# Patient Record
Sex: MALE | Race: WHITE | URBAN - METROPOLITAN AREA
[De-identification: names, ages, dates, MRNs, and addresses within clinical notes are randomized per-mention and may not be internally consistent; named-entity substitution may affect disease eponyms.]

---

## 2018-02-15 ENCOUNTER — APPOINTMENT (OUTPATIENT)
Dept: PHYSICAL THERAPY | Age: 58
End: 2018-02-15

## 2018-02-21 ENCOUNTER — HOSPITAL ENCOUNTER (OUTPATIENT)
Dept: PHYSICAL THERAPY | Age: 58
Discharge: HOME | End: 2018-02-21
Payer: COMMERCIAL

## 2018-02-21 PROCEDURE — 97110 THERAPEUTIC EXERCISES: CPT | Performed by: PHYSICAL THERAPIST

## 2018-02-21 PROCEDURE — 97116 GAIT TRAINING THERAPY: CPT | Performed by: PHYSICAL THERAPIST

## 2018-02-21 PROCEDURE — 97162 PT EVAL MOD COMPLEX 30 MIN: CPT | Performed by: PHYSICAL THERAPIST

## 2018-02-21 NOTE — PROGRESS NOTES
Brenna Patton 32  [facility] AT [facility] [address], [address], [address]  Phone: [phone]  Fax: [phone] / [address] [address] PHYSICAL THERAPY SERVICES  Patient Name: Delbert Menjivar : 1960   Medical   Diagnosis: Left leg pain [M79.605] Treatment Diagnosis: L Knee Pain   Onset Date: 2017     Referral Source: Camila Pelaez MD [facility]): 2018   Prior Hospitalization: See medical history Provider #: [id number]   Prior Level of Function: Pt was walking without AD prior to fall/left knee surgery   Comorbidities: HTN, h/o R DAVID, L knee scope in 2017   Medications: Verified on Patient Summary List   The Plan of Care and following information is based on the information from the initial evaluation.   ===========================================================================================  Assessment / key information:  66 y/o male presents to PT with one year h/o of left knee pain. He had arthroscopic menisectomy in 2017, and has been using a SPC since that time. Pt walks with significant lean to the right in standing, and lacks left knee stability when standing/walking. He notes anterior knee pain, which is worse during transfers. Examination reveals significant atrophy of left quad. He is unable to perform a strong quad set, and SLR has a painful lag. Pt is unable to take steps with SPC, due pain and feelings of left knee instability. Pt has focal swelling at peripatellar area.   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  ===========================================================================================  Eval Complexity: History MEDIUM  Complexity : 1-2 comorbidities / personal factors will impact the outcome/ POC ;  Examination  MEDIUM Complexity : 3 Standardized tests and measures addressing body structure, function, activity limitation and / or participation in recreation ; Presentation MEDIUM Complexity : Evolving with changing characteristics ; Decision Making MEDIUM Complexity : FOTO score of 26-74; Overall Complexity MEDIUM  Problem List: pain affecting function, decrease ROM, decrease strength, edema affecting function, impaired gait/ balance, decrease ADL/ functional abilitiies, decrease activity tolerance, decrease flexibility/ joint mobility and decrease transfer abilities   Treatment Plan may include any combination of the following: Therapeutic exercise, Therapeutic activities, Neuromuscular re-education, Physical agent/modality, Gait/balance training, Manual therapy, Dry Needling, Aquatic therapy, Patient education, Self Care training, Functional mobility training, Home safety training and Stair training  Patient / Family readiness to learn indicated by: asking questions, trying to perform skills and interest  Persons(s) to be included in education: patient (P)  Barriers to Learning/Limitations: None  Measures taken:    Patient Goal (s): \"get me ready for partial knee replacement ASAP\"   Patient self reported health status: good  Rehabilitation Potential: good   Short Term Goals: To be accomplished in  2  weeks:  1. Pt independent with use of cold packs and HEP for therex with minimal PF forces. 2. Pt able to minimize pain to </= 5/10 with HEP and activity modification.  Long Term Goals: To be accomplished in  6  weeks:  1. Pt to increase FOTO score from 30 to >/=53.  2. Pt independent with high level HEP for left knee flexibility, strengthening/endurance, and proprioception. 3. Pt able to walk with equal gait components bilaterally with LRAD. Frequency / Duration:   Patient to be seen  2-3  times per week for 6  weeks:  Patient / Caregiver education and instruction: self care, activity modification and exercises  G-Codes (GP): mame  Therapist Signature:  Lisa Aguilar PT Date: 0/57/2316   Certification Period: na Time: 10:55 AM   ===========================================================================================  I certify that the above Physical Therapy Services are being furnished while the patient is under my care. I agree with the treatment plan and certify that this therapy is necessary. Physician Signature:        Date:       Time:     Please sign and return to [facility] or you may fax the signed copy to [phone]. Thank you.

## 2018-02-21 NOTE — PROGRESS NOTES
PHYSICAL THERAPY - DAILY TREATMENT NOTE    Patient Name: Claire Price        Date: 2018  : 1960   YES Patient  Verified  Visit #:     Insurance: Payor: Estefania Elias / Plan:  [address] [facility] / Product Type: Commerical /      In time: 3:00 Out time: 4:00   Total Treatment Time: 60     Medicare Time Tracking (below)   Total Timed Codes (min):  na 1:1 Treatment Time:  na     TREATMENT AREA = Left leg pain [M79.605]    SUBJECTIVE  Pain Level (on 0 to 10 scale):  0-8  / 10   Medication Changes/New allergies or changes in medical history, any new surgeries or procedures?     NO    If yes, update Summary List   Subjective Functional Status/Changes:  []  No changes reported     See eval/POC          OBJECTIVE  Modalities Rationale:     decrease edema, decrease inflammation and decrease pain to improve patient's ability to walk without pain   min [] Estim, type/location:                                      []  att     []  unatt     []  w/US     []  w/ice    []  w/heat    min []  Mechanical Traction: type/lbs                   []  pro   []  sup   []  int   []  cont    []  before manual    []  after manual    min []  Ultrasound, settings/location:      min []  Iontophoresis w/ dexamethasone, location:                                               []  take home patch       []  in clinic   10 min [x]  Ice     []  Heat    location/position: L knee - supine after treatment    min []  Vasopneumatic Device, press/temp:     min []  Other:    [] Skin assessment post-treatment (if applicable):    []  intact    []  redness- no adverse reaction     []redness  adverse reaction:        15 min Therapeutic Exercise:  [x]  See flow sheet   Rationale:      increase ROM, increase strength and improve coordination to improve the patients ability to regain normal walking tolerance        min Therapeutic Activity:         min Neuromuscular Re-ed:        10 min Gait Training: Emphasis on L TKE during single limb support min Patient Education:  YES  Reviewed HEP   []  Progressed/Changed HEP based on: Other Objective/Functional Measures:    FOTO - 30     Post Treatment Pain Level (on 0 to 10) scale:   0  / 10     ASSESSMENT  Assessment/Changes in Function:     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    Progress toward goals / Updated goals:    Goals established today     PLAN  [x]  Upgrade activities as tolerated YES Continue plan of care   []  Discharge due to :    []  Other:      Therapist: Rodney Resendiz PT    Date: 2/21/2018 Time: 10:54 AM     Future Appointments  Date Time Provider [place]   2/21/2018 3:00 PM Rodney Resendiz PT [facility]

## 2018-02-23 ENCOUNTER — HOSPITAL ENCOUNTER (OUTPATIENT)
Dept: PHYSICAL THERAPY | Age: 58
Discharge: HOME | End: 2018-02-23
Payer: COMMERCIAL

## 2018-02-23 PROCEDURE — 97110 THERAPEUTIC EXERCISES: CPT

## 2018-02-23 NOTE — PROGRESS NOTES
PHYSICAL THERAPY - DAILY TREATMENT NOTE    Patient Name: Baudilio Dobbs        Date: 2018  : 1960   YES Patient  Verified  Visit #:     Insurance: Payor: Ted Norwood / Plan:  [address] [facility] / Product Type: Commerical /      In time: 3:35pm Out time: 4:30pm   Total Treatment Time: 55     Medicare Time Tracking (below)   Total Timed Codes (min):  na 1:1 Treatment Time:  na     TREATMENT AREA =  Left leg pain [M79.605]  SUBJECTIVE  Pain Level (on 0 to 10 scale):  0-8  / 10   Medication Changes/New allergies or changes in medical history, any new surgeries or procedures? NO    If yes, update Summary List   Subjective Functional Status/Changes:  []  No changes reported     \" I still have difficulty walking but I've been using my SPC. \"      OBJECTIVE  Modalities Rationale:     decrease inflammation, decrease pain and increase tissue extensibility to improve patient's ability to perform functional ADLs   min [] Estim, type/location:                                      []  att     []  unatt     []  w/US     []  w/ice    []  w/heat    min []  Mechanical Traction: type/lbs                   []  pro   []  sup   []  int   []  cont    []  before manual    []  after manual    min []  Ultrasound, settings/location:      min []  Iontophoresis w/ dexamethasone, location:                                               []  take home patch       []  in clinic   10 min [x]  Ice     []  Heat    location/position: Supine to L knee post treatment.     min []  Vasopneumatic Device, press/temp:     min []  Other:    [] Skin assessment post-treatment (if applicable):    []  intact    []  redness- no adverse reaction     []redness  adverse reaction:        45 min Therapeutic Exercise:  [x]  See flow sheet   Rationale:      increase ROM and increase strength to improve the patients ability to regain functional mobility of L knee for amb.     min Manual Therapy:    Rationale:      decrease pain, increase ROM, increase tissue extensibility and decrease trigger points to improve the patient's ability to regain full functional mobility     min Therapeutic Activity:    Rationale:    increase strength, improve coordination and increase proprioception to improve the patients ability to      min Neuromuscular Re-ed:    Rationale:    improve coordination, improve balance and increase proprioception to improve the patients ability to      min Gait Training:    Rationale:       min Patient Education:  YES  Reviewed HEP   []  Progressed/Changed HEP based on: Other Objective/Functional Measures:    Initiated therex per FS with focus on L LE strengthening for improved gait mechanics. Post Treatment Pain Level (on 0 to 10) scale:   0  / 10     ASSESSMENT  Assessment/Changes in Function:   VC's on TKE upon heel strike and WS. Amb with FWW to practice proper gait mechanics. Noted overall hip and knee weakness in L LE.   []  See Progress Note/Recertification   Patient will continue to benefit from skilled PT services to modify and progress therapeutic interventions, address functional mobility deficits, address ROM deficits, address strength deficits, analyze and address soft tissue restrictions, analyze and cue movement patterns, analyze and modify body mechanics/ergonomics and assess and modify postural abnormalities to attain remaining goals. Progress toward goals / Updated goals:    Progressing towards newly established goals.       PLAN  [x]  Upgrade activities as tolerated YES Continue plan of care   []  Discharge due to :    []  Other:      Therapist: SPRING Guidry    Date: 2/23/2018 Time: 4:59 PM     Future Appointments  Date Time Provider [place]   2/26/2018 3:30 PM Lee Pipe, PT [facility]   2/28/2018 4:30 PM Lee Pipe, PT [facility]   3/5/2018 3:30 PM Lee Pipe, PT [facility]   3/7/2018 4:30 PM Lee Pipe, PT [facility]   3/12/2018 4:00 PM Lee Pipe, PT [facility]   3/14/2018 5:00 PM Nilsa Hameed PT [facility]   3/19/2018 4:00 PM Nilsa Hameed PT [facility]   3/21/2018 5:00 PM Nilsa Hameed PT [facility]   3/26/2018 4:00 PM Nilsa Hameed PT [facility]   3/28/2018 5:00 PM Nilsa Hameed PT [facility]

## 2018-02-26 ENCOUNTER — HOSPITAL ENCOUNTER (OUTPATIENT)
Dept: PHYSICAL THERAPY | Age: 58
Discharge: HOME | End: 2018-02-26
Payer: COMMERCIAL

## 2018-02-26 PROCEDURE — 97116 GAIT TRAINING THERAPY: CPT | Performed by: PHYSICAL THERAPIST

## 2018-02-26 PROCEDURE — 97110 THERAPEUTIC EXERCISES: CPT | Performed by: PHYSICAL THERAPIST

## 2018-02-26 NOTE — PROGRESS NOTES
PHYSICAL THERAPY - DAILY TREATMENT NOTE    Patient Name: Katia Lopez        Date: 2018  : 1960   YES Patient  Verified  Visit #:   3   of   12  Insurance: Payor: Tasha Ours / Plan:  [address] [facility] / Product Type: Commerical /      In time: 3:40 Out time: 4:30   Total Treatment Time: 50     Medicare Time Tracking (below)   Total Timed Codes (min):  na 1:1 Treatment Time:  na     TREATMENT AREA = Left leg pain [M79.605]    SUBJECTIVE  Pain Level (on 0 to 10 scale):  4  / 10   Medication Changes/New allergies or changes in medical history, any new surgeries or procedures? NO    If yes, update Summary List   Subjective Functional Status/Changes:  []  No changes reported     Pt reports knee is getting stronger, slowly.           OBJECTIVE  Modalities Rationale:     decrease inflammation, decrease pain and increase tissue extensibility to improve patient's ability to prevent soreness   min [] Estim, type/location:                                      []  att     []  unatt     []  w/US     []  w/ice    []  w/heat    min []  Mechanical Traction: type/lbs                   []  pro   []  sup   []  int   []  cont    []  before manual    []  after manual    min []  Ultrasound, settings/location:      min []  Iontophoresis w/ dexamethasone, location:                                               []  take home patch       []  in clinic   10 min [x]  Ice     []  Heat    location/position: L knee - supine after treatment    min []  Vasopneumatic Device, press/temp:     min []  Other:    [] Skin assessment post-treatment (if applicable):    []  intact    []  redness- no adverse reaction     []redness  adverse reaction:        30 min Therapeutic Exercise:  [x]  See flow sheet   Rationale:      increase ROM, increase strength and improve coordination to improve the patients ability to walk normally        min Therapeutic Activity:         min Neuromuscular Re-ed:        10 min Gait Training: WS -> gait training with emphasis on weight shifting left during L mid stance, and maintaining control of TKE during stance phase      min Patient Education:  YES  Reviewed HEP   []  Progressed/Changed HEP based on: Other Objective/Functional Measures:    Pt was randlel to walk without symptoms when he concentrated on weight shifting left during L stance phase and maintained TKE. He had some symptoms when her lost TIKE control. Pt had difficulty with getting full AROM during sidelying clam, but did well with hooklying with theraband     Post Treatment Pain Level (on 0 to 10) scale:   3  / 10     ASSESSMENT  Assessment/Changes in Function:     Pt showing signs of improved gait once he gets loosened up. Encouraged increase use of CP at home to help manage swelling/pain. []  See Progress Note/Recertification   Patient will continue to benefit from skilled PT services to modify and progress therapeutic interventions, address functional mobility deficits, address ROM deficits, address strength deficits, analyze and address soft tissue restrictions, analyze and cue movement patterns, analyze and modify body mechanics/ergonomics and assess and modify postural abnormalities to attain remaining goals. Progress toward goals / Updated goals: Will continue to progress quad strength and standing knee stability as tolerated.      PLAN  [x]  Upgrade activities as tolerated YES Continue plan of care   []  Discharge due to :    []  Other:      Therapist: Howard Vegas PT    Date: 2/26/2018 Time: 11:37 AM     Future Appointments  Date Time Provider [place]   2/26/2018 3:30 PM Howard Vegas, PT [facility]   2/28/2018 4:30 PM Howard Vegas, PT [facility]   3/5/2018 3:30 PM Howard Vegas, PT [facility]   3/7/2018 4:30 PM Howard Vegas, PT [facility]   3/12/2018 4:00 PM Howard Vegas, PT [facility]   3/14/2018 5:00 PM Howard Vegas, PT [facility]   3/19/2018 4:00 PM Howard Vegas, PT [facility]   3/21/2018 5:00 PM Donna Quintanaigham, [facility]   3/26/2018 4:00 PM Donna Quintanaigham, [facility]   3/28/2018 5:00 PM [facility], [facility]

## 2018-02-28 ENCOUNTER — APPOINTMENT (OUTPATIENT)
Dept: PHYSICAL THERAPY | Age: 58
End: 2018-02-28
Payer: COMMERCIAL

## 2018-03-05 ENCOUNTER — HOSPITAL ENCOUNTER (OUTPATIENT)
Dept: PHYSICAL THERAPY | Age: 58
Discharge: HOME | End: 2018-03-05
Payer: COMMERCIAL

## 2018-03-05 PROCEDURE — 97110 THERAPEUTIC EXERCISES: CPT | Performed by: PHYSICAL THERAPIST

## 2018-03-05 PROCEDURE — 97116 GAIT TRAINING THERAPY: CPT | Performed by: PHYSICAL THERAPIST

## 2018-03-05 NOTE — PROGRESS NOTES
PHYSICAL THERAPY - DAILY TREATMENT NOTE    Patient Name: Donna Osuna        Date: 3/5/2018  : 1960   YES Patient  Verified  Visit #:     Insurance: Payor: Jenny Skinner / Plan:  [address] [facility] / Product Type: Commerical /      In time: 3:30 Out time: 4:20   Total Treatment Time: 50     Medicare Time Tracking (below)   Total Timed Codes (min):  na 1:1 Treatment Time:  na     TREATMENT AREA = Left leg pain [M79.605]    SUBJECTIVE  Pain Level (on 0 to 10 scale):  3  / 10   Medication Changes/New allergies or changes in medical history, any new surgeries or procedures? NO    If yes, update Summary List   Subjective Functional Status/Changes:  []  No changes reported     Pt reports able to fire quad more easily, but having difficulty with carryover to normal walking.          OBJECTIVE  Modalities Rationale:     decrease inflammation, decrease pain and increase tissue extensibility to improve patient's ability to prevent soreness   min [] Estim, type/location:                                      []  att     []  unatt     []  w/US     []  w/ice    []  w/heat    min []  Mechanical Traction: type/lbs                   []  pro   []  sup   []  int   []  cont    []  before manual    []  after manual    min []  Ultrasound, settings/location:      min []  Iontophoresis w/ dexamethasone, location:                                               []  take home patch       []  in clinic   10 min [x]  Ice     []  Heat    location/position: L knee - supine after treatment    min []  Vasopneumatic Device, press/temp:     min []  Other:    [] Skin assessment post-treatment (if applicable):    []  intact    []  redness- no adverse reaction     []redness  adverse reaction:        30 min Therapeutic Exercise:  [x]  See flow sheet   Rationale:      increase ROM, increase strength and improve coordination to improve the patients ability to improve gait mechanics/endurance        min Therapeutic Activity: min Neuromuscular Re-ed:        10 min Gait Training: Practiced with SPC and emphasizing weight shift L during L single limb support      min Patient Education:  YES  Reviewed HEP   []  Progressed/Changed HEP based on: Other Objective/Functional Measures: Added SLS today in // bars     Post Treatment Pain Level (on 0 to 10) scale:   0  / 10     ASSESSMENT  Assessment/Changes in Function:     Pt is progressing well with strengthening and can walk with normal mechanics using SPC as tolerated. []  See Progress Note/Recertification   Patient will continue to benefit from skilled PT services to modify and progress therapeutic interventions, address functional mobility deficits, address ROM deficits, address strength deficits, analyze and address soft tissue restrictions, analyze and cue movement patterns, analyze and modify body mechanics/ergonomics and assess and modify postural abnormalities to attain remaining goals. Progress toward goals / Updated goals:    Pt is not consistent, but can demonstrate normal gait mechanics without practice.      PLAN  [x]  Upgrade activities as tolerated YES Continue plan of care   []  Discharge due to :    []  Other:      Therapist: Carmela Hong PT    Date: 3/5/2018 Time: 1:12 PM     Future Appointments  Date Time Provider Long [place]   3/5/2018 3:30 PM Carmela Rounds, PT [facility]   3/7/2018 4:30 PM Carmela Rounds, PT [facility]   3/12/2018 4:00 PM Carmela Rounds, PT [facility]   3/14/2018 5:00 PM Carmela Rounds, PT [facility]   3/19/2018 4:00 PM Carmela Rounds, PT [facility]   3/21/2018 5:00 PM Carmela Rounds, PT [facility]   3/26/2018 4:00 PM Carmela Rounds, PT [facility]   3/28/2018 5:00 PM Carmela Rounds, PT [facility]

## 2018-03-07 ENCOUNTER — HOSPITAL ENCOUNTER (OUTPATIENT)
Dept: PHYSICAL THERAPY | Age: 58
Discharge: HOME | End: 2018-03-07
Payer: COMMERCIAL

## 2018-03-07 PROCEDURE — 97110 THERAPEUTIC EXERCISES: CPT | Performed by: PHYSICAL THERAPIST

## 2018-03-07 PROCEDURE — 97116 GAIT TRAINING THERAPY: CPT | Performed by: PHYSICAL THERAPIST

## 2018-03-07 NOTE — PROGRESS NOTES
PHYSICAL THERAPY - DAILY TREATMENT NOTE    Patient Name: Juan Flores        Date: 3/7/2018  : 1960   YES Patient  Verified  Visit #:     Insurance: Payor: Sierra Morris / Plan:  [address] [facility] / Product Type: Commerical /      In time: 4:30 Out time: 5:40   Total Treatment Time: 70     Medicare Time Tracking (below)   Total Timed Codes (min):  na 1:1 Treatment Time:  na     TREATMENT AREA = Left leg pain [M79.605]    SUBJECTIVE  Pain Level (on 0 to 10 scale):  3  / 10   Medication Changes/New allergies or changes in medical history, any new surgeries or procedures? NO    If yes, update Summary List   Subjective Functional Status/Changes:  []  No changes reported     Pt notes having more control over his pain by achieving full knee extension when walking.           OBJECTIVE  Modalities Rationale:     decrease edema, decrease inflammation and decrease pain to improve patient's ability to improve walking tolerance   min [] Estim, type/location:                                      []  att     []  unatt     []  w/US     []  w/ice    []  w/heat    min []  Mechanical Traction: type/lbs                   []  pro   []  sup   []  int   []  cont    []  before manual    []  after manual    min []  Ultrasound, settings/location:      min []  Iontophoresis w/ dexamethasone, location:                                               []  take home patch       []  in clinic   10 min [x]  Ice     []  Heat    location/position: L knee - supine after treatment    min []  Vasopneumatic Device, press/temp:     min []  Other:    [] Skin assessment post-treatment (if applicable):    []  intact    []  redness- no adverse reaction     []redness  adverse reaction:        45 min Therapeutic Exercise:  [x]  See flow sheet   Rationale:      increase ROM, increase strength and improve coordination to improve the patients ability to increase standing/walking tolerance        min Therapeutic Activity:         min Neuromuscular Re-ed:        15 min Gait Training: Weight shifting, SLS, and walking with emphasis on weight shifting to the left to stand more erect during gait with SPC - using mirror for feedback      min Patient Education:  YES  Reviewed HEP   []  Progressed/Changed HEP based on: Other Objective/Functional Measures: Added mini squats to using mirror for feedback on symmetry     Post Treatment Pain Level (on 0 to 10) scale:   0  / 10     ASSESSMENT  Assessment/Changes in Function:     Pt did not have much knee symptoms during mini squats and did increase depth on second set. []  See Progress Note/Recertification   Patient will continue to benefit from skilled PT services to modify and progress therapeutic interventions, address functional mobility deficits, address ROM deficits, address strength deficits, analyze and address soft tissue restrictions, analyze and cue movement patterns, analyze and modify body mechanics/ergonomics and assess and modify postural abnormalities to attain remaining goals. Progress toward goals / Updated goals: Will progress quad strengthening as tolerated.      PLAN  [x]  Upgrade activities as tolerated YES Continue plan of care   []  Discharge due to :    []  Other:      Therapist: Álvaro Stephens PT    Date: 3/7/2018 Time: 3:36 PM     Future Appointments  Date Time Provider [place]   3/7/2018 4:30 PM Álvaro Stephens PT [facility]   3/12/2018 4:00 PM Álvaro Stephens, PT [facility]   3/14/2018 5:00 PM Álvaro Stephens, PT [facility]   3/19/2018 4:00 PM Álvaro Stephens PT [facility]   3/21/2018 5:00 PM Álvaro Stephens PT [facility]   3/26/2018 4:00 PM Álvaro Stephens PT [facility]   3/28/2018 5:00 PM Álvaro Stephens, PT [facility]

## 2018-03-12 ENCOUNTER — HOSPITAL ENCOUNTER (OUTPATIENT)
Dept: PHYSICAL THERAPY | Age: 58
End: 2018-03-12
Payer: COMMERCIAL

## 2018-03-14 ENCOUNTER — APPOINTMENT (OUTPATIENT)
Dept: PHYSICAL THERAPY | Age: 58
End: 2018-03-14
Payer: COMMERCIAL

## 2018-03-16 ENCOUNTER — HOSPITAL ENCOUNTER (OUTPATIENT)
Dept: PHYSICAL THERAPY | Age: 58
Discharge: HOME | End: 2018-03-16
Payer: COMMERCIAL

## 2018-03-16 PROCEDURE — 97110 THERAPEUTIC EXERCISES: CPT

## 2018-03-16 NOTE — PROGRESS NOTES
PHYSICAL THERAPY - DAILY TREATMENT NOTE    Patient Name: Teresa Day        Date: 3/16/2018  : 1960   YES Patient  Verified  Visit #:     Insurance: Payor: Berlin Foy / Plan: [address] / Product Type: Commerical /      In time: 3:40pm Out time: 4:45pm   Total Treatment Time: 65/60     Medicare Time Tracking (below)   Total Timed Codes (min):  na 1:1 Treatment Time:  na     TREATMENT AREA =  Left leg pain [M79.605]  SUBJECTIVE  Pain Level (on 0 to 10 scale):  2  / 10   Medication Changes/New allergies or changes in medical history, any new surgeries or procedures? NO    If yes, update Summary List   Subjective Functional Status/Changes:  []  No changes reported     \"[place] and feel a little sore in my L thigh. \"      OBJECTIVE  Modalities Rationale:     decrease inflammation, decrease pain and increase tissue extensibility to improve patient's ability to perform functional ADLs   min [] Estim, type/location:                                      []  att     []  unatt     []  w/US     []  w/ice    []  w/heat    min []  Mechanical Traction: type/lbs                   []  pro   []  sup   []  int   []  cont    []  before manual    []  after manual    min []  Ultrasound, settings/location:      min []  Iontophoresis w/ dexamethasone, location:                                               []  take home patch       []  in clinic   10 min [x]  Ice     []  Heat    location/position: Supine to L knee with wedge.     min []  Vasopneumatic Device, press/temp:     min []  Other:    [] Skin assessment post-treatment (if applicable):    []  intact    []  redness- no adverse reaction     []redness  adverse reaction:        50 min Therapeutic Exercise:  [x]  See flow sheet   Rationale:      increase ROM and increase strength to improve the patients ability to regain functional mobility of L knee for normalized gait mechanics without AD.     min Manual Therapy:    Rationale:      decrease pain, increase ROM, increase tissue extensibility and decrease trigger points to improve the patient's ability to regain full functional mobility     min Therapeutic Activity:    Rationale:    increase strength, improve coordination and increase proprioception to improve the patients ability to      min Neuromuscular Re-ed:    Rationale:    improve coordination, improve balance and increase proprioception to improve the patients ability to      min Gait Training:    Rationale:       min Patient Education:  YES  Reviewed HEP   []  Progressed/Changed HEP based on: Other Objective/Functional Measures: Added HR in parallel bars to improve ankle strategy during gait. Added 1lb for S/L ABD. Held on gait training today due to dec instability [place]. Post Treatment Pain Level (on 0 to 10) scale:   0  / 10     ASSESSMENT  Assessment/Changes in Function:   Pt demo improved TKE during SLS without UE support today. []  See Progress Note/Recertification   Patient will continue to benefit from skilled PT services to modify and progress therapeutic interventions, address functional mobility deficits, address ROM deficits, address strength deficits, analyze and address soft tissue restrictions, analyze and cue movement patterns, analyze and modify body mechanics/ergonomics and assess and modify postural abnormalities to attain remaining goals. Progress toward goals / Updated goals:    Progressing towards LTG 3.      PLAN  [x]  Upgrade activities as tolerated YES Continue plan of care   []  Discharge due to :    []  Other:      Therapist: SPRING Daugherty    Date: 3/16/2018 Time: 4:58 PM     Future Appointments  Date Time Provider L[place]   3/19/2018 4:00 PM Manpreet Henriquez PT [facility] [place]   3/21/2018 5:00 PM Manpreet Henriquez PT [facility] [place]   3/26/2018 4:00 PM Manpreet Henriquez PT [facility] [place]   3/28/2018 5:00 PM [place] Desean Khan, PT [facility] [place]

## 2018-03-19 ENCOUNTER — HOSPITAL ENCOUNTER (OUTPATIENT)
Dept: PHYSICAL THERAPY | Age: 58
Discharge: HOME | End: 2018-03-19
Payer: COMMERCIAL

## 2018-03-19 PROCEDURE — 97110 THERAPEUTIC EXERCISES: CPT | Performed by: PHYSICAL THERAPIST

## 2018-03-19 NOTE — PROGRESS NOTES
PHYSICAL THERAPY - DAILY TREATMENT NOTE    Patient Name: Juan Flores        Date: 3/19/2018  : 1960   YES Patient  Verified  Visit #:     Insurance: Payor: Sierra Morris / Plan:  [address] / Product Type: Commerical /      In time: 4:05 Out time: 5:00   Total Treatment Time: 55     Medicare Time Tracking (below)   Total Timed Codes (min):  na 1:1 Treatment Time:  na     TREATMENT AREA = Left leg pain [M79.605]    SUBJECTIVE  Pain Level (on 0 to 10 scale):  2  / 10   Medication Changes/New allergies or changes in medical history, any new surgeries or procedures? NO    If yes, update Summary List   Subjective Functional Status/Changes:  []  No changes reported     Pt reports feeling better, has less frequent/intense left knee pain.           OBJECTIVE  Modalities Rationale:     decrease edema, decrease inflammation and decrease pain to improve patient's ability to prevent soreness   min [] Estim, type/location:                                      []  att     []  unatt     []  w/US     []  w/ice    []  w/heat    min []  Mechanical Traction: type/lbs                   []  pro   []  sup   []  int   []  cont    []  before manual    []  after manual    min []  Ultrasound, settings/location:      min []  Iontophoresis w/ dexamethasone, location:                                               []  take home patch       []  in clinic   10 min [x]  Ice     []  Heat    location/position: L knee - supine after treatment    min []  Vasopneumatic Device, press/temp:     min []  Other:    [] Skin assessment post-treatment (if applicable):    []  intact    []  redness- no adverse reaction     []redness  adverse reaction:        45 min Therapeutic Exercise:  [x]  See flow sheet   Rationale:      increase ROM, increase strength, improve coordination and increase proprioception to improve the patients ability to walk with normal mechanics        min Therapeutic Activity:         min Neuromuscular Re-ed: min Gait Training:       min Patient Education:  YES  Reviewed HEP   []  Progressed/Changed HEP based on: Other Objective/Functional Measures: Added L hip flexor stretch in standing to allow hip extension during terminal stance on left during walking     Post Treatment Pain Level (on 0 to 10) scale:   0  / 10     ASSESSMENT  Assessment/Changes in Function:     Adjusted SPC up one more notch today, and pt was randell to walk with near normal weight shift and erect posture when walking (using a mirror). []  See Progress Note/Recertification   Patient will continue to benefit from skilled PT services to modify and progress therapeutic interventions, address functional mobility deficits, address ROM deficits, address strength deficits, analyze and address soft tissue restrictions, analyze and cue movement patterns, analyze and modify body mechanics/ergonomics and assess and modify postural abnormalities to attain remaining goals. Progress toward goals / Updated goals:     Will continue with strength progression     PLAN  [x]  Upgrade activities as tolerated YES Continue plan of care   []  Discharge due to :    []  Other:      Therapist: Donn Tolbert PT    Date: 3/19/2018 Time: 10:33 AM     Future Appointments  Date Time Provider [place]   3/19/2018 4:00 PM Donn Tolbert PT [facility] [place]   3/21/2018 5:00 PM Donn Tolbert PT [facility] [place]   3/26/2018 4:00 PM Donn Tolbert PT [facility] [place]   3/28/2018 5:00 PM Donn Tolbert PT [facility]

## 2018-03-21 ENCOUNTER — HOSPITAL ENCOUNTER (OUTPATIENT)
Dept: PHYSICAL THERAPY | Age: 58
Discharge: HOME | End: 2018-03-21
Payer: COMMERCIAL

## 2018-03-21 PROCEDURE — 97116 GAIT TRAINING THERAPY: CPT | Performed by: PHYSICAL THERAPIST

## 2018-03-21 PROCEDURE — 97110 THERAPEUTIC EXERCISES: CPT | Performed by: PHYSICAL THERAPIST

## 2018-03-21 NOTE — PROGRESS NOTES
PHYSICAL THERAPY - DAILY TREATMENT NOTE    Patient Name: Rico Calvo        Date: 3/21/2018  : 1960   YES Patient  Verified  Visit #:     Insurance: Payor: Zonia Lowry / Plan: [address] [place] [address] Rd PT / Product Type: Commerical /      In time: 5:50 Out time: 6:15   Total Treatment Time: 70     Medicare Time Tracking (below)   Total Timed Codes (min):  na 1:1 Treatment Time:  na     TREATMENT AREA = Left leg pain [M79.605]    SUBJECTIVE  Pain Level (on 0 to 10 scale):  2  / 10   Medication Changes/New allergies or changes in medical history, any new surgeries or procedures? NO    If yes, update Summary List   Subjective Functional Status/Changes:  []  No changes reported     Pt reports trying to walk better and has kept his SPC elevated form last session.           OBJECTIVE  Modalities Rationale:     decrease edema, decrease inflammation and decrease pain to improve patient's ability to prevent soreness   min [] Estim, type/location:                                      []  att     []  unatt     []  w/US     []  w/ice    []  w/heat    min []  Mechanical Traction: type/lbs                   []  pro   []  sup   []  int   []  cont    []  before manual    []  after manual    min []  Ultrasound, settings/location:      min []  Iontophoresis w/ dexamethasone, location:                                               []  take home patch       []  in clinic   10 min [x]  Ice     []  Heat    location/position: L knee - supine after treatment    min []  Vasopneumatic Device, press/temp:     min []  Other:    [] Skin assessment post-treatment (if applicable):    []  intact    []  redness- no adverse reaction     []redness  adverse reaction:        45 min Therapeutic Exercise:  [x]  See flow sheet   Rationale:      increase ROM, increase strength, improve coordination and increase proprioception to improve the patients ability to regain full functional strength        min Therapeutic Activity:         min Neuromuscular Re-ed:        15 min Gait Training: Using mirror to help walk with proper weight shift L and have equal stance time on each LE      min Patient Education:  YES  Reviewed HEP   []  Progressed/Changed HEP based on: Other Objective/Functional Measures:    Pt still has tendency to lean to the right when standing/walking. Added 4 inch step ups today     Post Treatment Pain Level (on 0 to 10) scale:   0  / 10     ASSESSMENT  Assessment/Changes in Function:     Pt able to walk with erect posture and without SPC, but has decreased stance time during left single limb support. He tolerated step ups well using // bars to allow UEs to assist.      []  See Progress Note/Recertification   Patient will continue to benefit from skilled PT services to modify and progress therapeutic interventions, address functional mobility deficits, address ROM deficits, address strength deficits, analyze and address soft tissue restrictions, analyze and cue movement patterns, analyze and modify body mechanics/ergonomics and assess and modify postural abnormalities to attain remaining goals.    Progress toward goals / Updated goals:    Pt continue to progress with strength and exercise tolerance     PLAN  [x]  Upgrade activities as tolerated YES Continue plan of care   []  Discharge due to :    []  Other:      Therapist: Nancie Chance PT    Date: 3/21/2018 Time: 10:17 AM     Future Appointments  Date Time Provider [place]   3/21/2018 5:00 PM [place]anc[place] [facility] [place]   3/26/2018 4:00 PM Nancie Chance [place] [facility] [place]   3/28/2018 5:00 PM Nancie Chance PT [facility] [place]

## 2018-03-26 ENCOUNTER — APPOINTMENT (OUTPATIENT)
Dept: PHYSICAL THERAPY | Age: 58
End: 2018-03-26
Payer: COMMERCIAL

## 2018-03-28 ENCOUNTER — HOSPITAL ENCOUNTER (OUTPATIENT)
Dept: PHYSICAL THERAPY | Age: 58
Discharge: HOME | End: 2018-03-28
Payer: COMMERCIAL

## 2018-03-28 PROCEDURE — 97110 THERAPEUTIC EXERCISES: CPT | Performed by: PHYSICAL THERAPIST

## 2018-03-28 PROCEDURE — 97116 GAIT TRAINING THERAPY: CPT | Performed by: PHYSICAL THERAPIST

## 2018-03-28 NOTE — PROGRESS NOTES
PHYSICAL THERAPY - DAILY TREATMENT NOTE    Patient Name: Huong Tillman        Date: 3/28/2018  : 1960   YES Patient  Verified  Visit #:     Insurance: Payor: Vimal Haque / Plan:  [address] / Product Type: Commerical /      In time: 5:05 Out time: 6:20   Total Treatment Time: 70     Medicare Time Tracking (below)   Total Timed Codes (min):  na 1:1 Treatment Time:  na     TREATMENT AREA = Left leg pain [M79.605]    SUBJECTIVE  Pain Level (on 0 to 10 scale):  3  / 10   Medication Changes/New allergies or changes in medical history, any new surgeries or procedures? NO    If yes, update Summary List   Subjective Functional Status/Changes:  []  No changes reported     Pt reports having increase knee pain after attending a minor league baseball game over the weekend. He had to park form the stadium and walked a long way to/from car.          OBJECTIVE  Modalities Rationale:     decrease edema, decrease inflammation and decrease pain to improve patient's ability to resume normal ambulation   min [] Estim, type/location:                                      []  att     []  unatt     []  w/US     []  w/ice    []  w/heat    min []  Mechanical Traction: type/lbs                   []  pro   []  sup   []  int   []  cont    []  before manual    []  after manual    min []  Ultrasound, settings/location:      min []  Iontophoresis w/ dexamethasone, location:                                               []  take home patch       []  in clinic   10 min [x]  Ice     []  Heat    location/position: L knee - supine after treatment    min []  Vasopneumatic Device, press/temp:     min []  Other:    [] Skin assessment post-treatment (if applicable):    []  intact    []  redness- no adverse reaction     []redness  adverse reaction:        45 min Therapeutic Exercise:  [x]  See flow sheet   Rationale:      increase ROM, increase strength and improve coordination to improve the patients ability to regain normal gait mechanics        min Therapeutic Activity:         min Neuromuscular Re-ed:        15 min Gait Training: Using mirror practice walking with weight shift L during R foot swing, used cane in each hand trying to change his amount of WB through L LE      min Patient Education:  YES  Reviewed HEP   []  Progressed/Changed HEP based on: Other Objective/Functional Measures:    Pt able to walk with near normal erect posture and weight shifting at times, after practice. Post Treatment Pain Level (on 0 to 10) scale:   0  / 10     ASSESSMENT  Assessment/Changes in Function:     Pt did not have any significant increase in soreness, despite pain when arriving for today;s session. []  See Progress Note/Recertification   Patient will continue to benefit from skilled PT services to modify and progress therapeutic interventions, address functional mobility deficits, address ROM deficits, address strength deficits, analyze and address soft tissue restrictions, analyze and cue movement patterns, analyze and modify body mechanics/ergonomics and assess and modify postural abnormalities to attain remaining goals. Progress toward goals / Updated goals: Will progress therex as tolerated with emphaiss on normalize gait mechanics and erect posture.      PLAN  [x]  Upgrade activities as tolerated YES Continue plan of care   []  Discharge due to :    []  Other:      Therapist: Sofy Walsh PT    Date: 3/28/2018 Time: 12:57 PM     Future Appointments  Date Time Provider [place]   3/28/2018 5:00 PM Sofy Walsh, [place] [facility] [place]   3/30/2018 4:00 PM [place] [facility] [place]

## 2018-03-30 ENCOUNTER — HOSPITAL ENCOUNTER (OUTPATIENT)
Dept: PHYSICAL THERAPY | Age: 58
Discharge: HOME | End: 2018-03-30
Payer: COMMERCIAL

## 2018-03-30 PROCEDURE — 97116 GAIT TRAINING THERAPY: CPT

## 2018-03-30 PROCEDURE — 97110 THERAPEUTIC EXERCISES: CPT

## 2018-03-30 NOTE — PROGRESS NOTES
[address] [place] [address] PHYSICAL THERAPY  [address] [facility] [address], [place], [address] -   Phone: [phone]  Fax: [phone]  [x]  PROGRESS NOTE  []  DISCHARGE SUMMARY  Patient Name: Dawna Sanchez : 1960   Treatment Diagnosis: Left leg pain [M79.605]     Referral Source: Anahy Perez MD     Date of Initial Visit: 3/30/2018 Attended Visits: 10 Missed Visits: 0     SUMMARY OF TREATMENT  Therapeutic exercises including ROM, strengthening and stretching; gait and balance training, postural re-education, modalities: CP, HEP instruction. CURRENT STATUS   66 y/o male presents to PT with one year h/o of left knee pain. He had arthroscopic menisectomy in 2017, and has been using a SPC since that time. Mr. Waleska Colunga is showing steady progress in therapy with c/o 4/10 pain at worst during increase activity such as prolong ambulation / stair negotiation. He is able to demonstrate a SLR without an ext lag, ascend a 6 in step  with B UE support, and stand on his L leg for 10 seconds with minimal assistance from R hand. Mr. Waleska Colunga continues to display overall weakness in L hip/knee and will benefit from continue therapy to work on proper gait mechanics for decrease fall risk in the community. See below for updated goals. Goal/Measure of Progress Goal Met? 1.   Pt independent with use of cold packs and HEP for therex with minimal PF forces. Status at last Eval: Established Current Status: I with HEP yes   2. Pt able to minimize pain to </= 5/10 with HEP and activity modification. Status at last Eval: 8/10 @ worst Current Status: 4/10 at worst  yes   3. Pt to increase FOTO score from 30 to >/=53. Status at last Eval: 30 Current Status: 46 progressing   4. Pt able to walk with equal gait components bilaterally with LRAD. Status at last Eval: Demoed inc knee instability while amb with SPC as evidence by dec stance time on L LE during gait.  Current Status: Equal weight shifting during amb with SPC yes     New Goals to be achieved in __3-4__  Weeks:  1. Improve FOTO score from 46 points to > or = 53 points indicating improved tolerance with ADLs in regards to prolong amb with LRRUCHI. Beulah Trujillo 2.  Pt will be able to demonstrate a 15 sec SLS on a solid surface without UE support in regards to improve midstance during gait without AD. 3.  Pt will demo >/= 4+//5 overall L knee strength for stair/obstacle negotiation at home and in the community. G-Codes (GP): na  RECOMMENDATIONS    Specifics: Pt to continue PT 2-3x/week for 3-4 weeks to progress towards LTGs and final HEP. If you have any questions/comments please contact us directly at [phone]. Thank you for allowing us to assist in the care of your patient. Therapist Signature: Sotero Gonzalez PTA Date: 3/30/2018   Reporting Period: 2/21/2018-3/30/2018 Time: 3:32 PM   NOTE TO PHYSICIAN:  PLEASE COMPLETE THE ORDERS BELOW AND FAX TO   [facility] [place] Physical Therapy: [phone]  If you are unable to process this request in 24 hours please contact our office: [phone]    ___ I have read the above report and request that my patient continue as recommended.   ___ I have read the above report and request that my patient continue therapy with the following changes/special instructions:_________________________________________________________   ___ I have read the above report and request that my patient be discharged from therapy.      Physician Signature:        Date:       Time:

## 2018-03-30 NOTE — PROGRESS NOTES
PHYSICAL THERAPY - DAILY TREATMENT NOTE    Patient Name: Karli Recinos        Date: 3/30/2018  : 1960   YES Patient  Verified  Visit #:   10   of   12  Insurance: Payor: Nuris NVELO / Plan: [address] [place] [address] Ramon PT / Product Type: Commerical /      In time: 4:00pm Out time: 5:00pm   Total Treatment Time: 60     Medicare Time Tracking (below)   Total Timed Codes (min):  na 1:1 Treatment Time:  na     TREATMENT AREA =  Left leg pain [M79.605]  SUBJECTIVE  Pain Level (on 0 to 10 scale):  2  / 10   Medication Changes/New allergies or changes in medical history, any new surgeries or procedures? NO    If yes, update Summary List   Subjective Functional Status/Changes:  []  No changes reported     Pt reports that he has been working on his walking with the cane on his L side for inc weight bearing through L leg. OBJECTIVE  Modalities Rationale:     decrease inflammation, decrease pain and increase tissue extensibility to improve patient's ability to perform functional ADLs   min [] Estim, type/location:                                      []  att     []  unatt     []  w/US     []  w/ice    []  w/heat    min []  Mechanical Traction: type/lbs                   []  pro   []  sup   []  int   []  cont    []  before manual    []  after manual    min []  Ultrasound, settings/location:      min []  Iontophoresis w/ dexamethasone, location:                                               []  take home patch       []  in clinic   10 min [x]  Ice     []  Heat    location/position: L knee - supine after treatment    min []  Vasopneumatic Device, press/temp:     min []  Other:    [] Skin assessment post-treatment (if applicable):    []  intact    []  redness- no adverse reaction     []redness  adverse reaction:        40 min Therapeutic Exercise:  [x]  See flow sheet   Rationale:      increase ROM and increase strength to improve the patients ability to regain functional mobility of L knee for ambulating without AD. min Manual Therapy:    Rationale:      decrease pain, increase ROM, increase tissue extensibility and decrease trigger points to improve the patient's ability to regain full functional mobility     min Therapeutic Activity:    Rationale:    increase strength, improve coordination and increase proprioception to improve the patients ability to      min Neuromuscular Re-ed:    Rationale:    improve coordination, improve balance and increase proprioception to improve the patients ability to     10 min Gait Training: Using mirror to help walk with proper weight shift L and have equal stance time on each LE   Rationale:       min Patient Education:  YES  Reviewed HEP   []  Progressed/Changed HEP based on: Other Objective/Functional Measures:    Foto: 46    Added standing hip ABD     Post Treatment Pain Level (on 0 to 10) scale:   0  / 10     ASSESSMENT  Assessment/Changes in Function:     See PN     []  See Progress Note/Recertification   Patient will continue to benefit from skilled PT services to modify and progress therapeutic interventions, address functional mobility deficits, address ROM deficits, address strength deficits, analyze and address soft tissue restrictions, analyze and cue movement patterns, analyze and modify body mechanics/ergonomics and assess and modify postural abnormalities to attain remaining goals. Progress toward goals / Updated goals: · Short Term Goals: To be accomplished in  2  weeks:  1. Pt independent with use of cold packs and HEP for therex with minimal PF forces. 2. Pt able to minimize pain to </= 5/10 with HEP and activity modification. · Long Term Goals: To be accomplished in  6  weeks:  1. Pt to increase FOTO score from 30 to >/=53.  2. Pt independent with high level HEP for left knee flexibility, strengthening/endurance, and proprioception. 3. Pt able to walk with equal gait components bilaterally with LRAD.      PLAN  [x]  Upgrade activities as tolerated YES Continue plan of care   []  Discharge due to :    []  Other:      Therapist: SPRING Johnson    Date: 3/30/2018 Time: 3:27 PM     Future Appointments  Date Time Provider [place]   3/30/2018 4:00 PM Aristeo Chirinos [facility] [place]

## 2018-04-09 ENCOUNTER — HOSPITAL ENCOUNTER (OUTPATIENT)
Dept: PHYSICAL THERAPY | Age: 58
Discharge: HOME | End: 2018-04-09
Payer: COMMERCIAL

## 2018-04-09 PROCEDURE — 97116 GAIT TRAINING THERAPY: CPT | Performed by: PHYSICAL THERAPIST

## 2018-04-09 PROCEDURE — 97110 THERAPEUTIC EXERCISES: CPT | Performed by: PHYSICAL THERAPIST

## 2018-04-09 NOTE — PROGRESS NOTES
PHYSICAL THERAPY - DAILY TREATMENT NOTE    Patient Name: Yeimy Rahman        Date: 2018  : 1960   YES Patient  Verified  Visit #:     Insurance: Payor: Kylee Bhakta / [facility]  [facility] / Product Type: Commerical /      In time: 3:40 Out time: 4:40   Total Treatment Time: 60     Medicare Time Tracking (below)   Total Timed Codes (min):  na 1:1 Treatment Time:  na     TREATMENT AREA = Left leg pain [M79.605]    SUBJECTIVE  Pain Level (on 0 to 10 scale):  3  / 10   Medication Changes/New allergies or changes in medical history, any new surgeries or procedures? NO    If yes, update Summary List   Subjective Functional Status/Changes:  []  No changes reported     Pt notes seeing MD and received a knee brace which has helped to reduce left knee symptoms in standing/walking.           OBJECTIVE  Modalities Rationale:     decrease edema, decrease inflammation and decrease pain to improve patient's ability to prevent soreness   min [] Estim, type/location:                                      []  att     []  unatt     []  w/US     []  w/ice    []  w/heat    min []  Mechanical Traction: type/lbs                   []  pro   []  sup   []  int   []  cont    []  before manual    []  after manual    min []  Ultrasound, settings/location:      min []  Iontophoresis w/ dexamethasone, location:                                               []  take home patch       []  in clinic   10 min [x]  Ice     []  Heat    location/position: L knee - supine after treatment    min []  Vasopneumatic Device, press/temp:     min []  Other:    [] Skin assessment post-treatment (if applicable):    []  intact    []  redness- no adverse reaction     []redness  adverse reaction:        40 min Therapeutic Exercise:  [x]  See flow sheet   Rationale:      increase ROM, increase strength, improve coordination and increase proprioception to improve the patients ability to regain functional standing/walkign tolerance min Therapeutic Activity:         min Neuromuscular Re-ed:        10 min Gait Training: Used mirror to help correct gait mechanics. Required VCs to weight shift pelvis left during left single limb support. min Patient Education:  YES  Reviewed HEP   []  Progressed/Changed HEP based on: Other Objective/Functional Measures:    Pt showing difficulty with stepping up on 6 inch step. Requires UEs and uses right ankle to assist with plantar flexion. Post Treatment Pain Level (on 0 to 10) scale:   0  / 10     ASSESSMENT  Assessment/Changes in Function:     Pt requires VCs to correct gait mechanics and he still requires/relies on SPC. []  See Progress Note/Recertification   Patient will continue to benefit from skilled PT services to modify and progress therapeutic interventions, address functional mobility deficits, address ROM deficits, address strength deficits, analyze and address soft tissue restrictions, analyze and cue movement patterns, analyze and modify body mechanics/ergonomics and assess and modify postural abnormalities to attain remaining goals. Progress toward goals / Updated goals: Will continue to progress left knee strength to increase functional activity tolerance.       PLAN  [x]  Upgrade activities as tolerated YES Continue plan of care   []  Discharge due to :    []  Other:      Therapist: Cherelle Quezada PT    Date: 4/9/2018 Time: 10:29 AM     Future Appointments  Date Time Provider [place]   4/9/2018 3:30 PM Cherelle Quezada PT [address]

## 2018-04-11 ENCOUNTER — HOSPITAL ENCOUNTER (OUTPATIENT)
Dept: PHYSICAL THERAPY | Age: 58
Discharge: HOME | End: 2018-04-11
Payer: COMMERCIAL

## 2018-04-11 PROCEDURE — 97110 THERAPEUTIC EXERCISES: CPT | Performed by: PHYSICAL THERAPIST

## 2018-04-11 PROCEDURE — 97116 GAIT TRAINING THERAPY: CPT | Performed by: PHYSICAL THERAPIST

## 2018-04-11 NOTE — PROGRESS NOTES
PHYSICAL THERAPY - DAILY TREATMENT NOTE    Patient Name: Odette Finley        Date: 2018  : 1960   YES Patient  Verified  Visit #:     Insurance: Payor: Bill Esteban / Plan: [address] [place] [address] [facility] / Product Type: Commerical /      In time: 5:05 Out time: 6:05   Total Treatment Time: 60     Medicare Time Tracking (below)   Total Timed Codes (min):  na 1:1 Treatment Time:  na     TREATMENT AREA = Left leg pain [M79.605]    SUBJECTIVE  Pain Level (on 0 to 10 scale):  3  / 10   Medication Changes/New allergies or changes in medical history, any new surgeries or procedures? NO    If yes, update Summary List   Subjective Functional Status/Changes:  []  No changes reported     Pt reports knee brace has been helping some, and he is wearing it all the time.           OBJECTIVE  Modalities Rationale:     decrease edema, decrease inflammation and decrease pain to improve patient's ability to prevent soreness   min [] Estim, type/location:                                      []  att     []  unatt     []  w/US     []  w/ice    []  w/heat    min []  Mechanical Traction: type/lbs                   []  pro   []  sup   []  int   []  cont    []  before manual    []  after manual    min []  Ultrasound, settings/location:      min []  Iontophoresis w/ dexamethasone, location:                                               []  take home patch       []  in clinic   10 min [x]  Ice     []  Heat    location/position: L knee - supine after treatment    min []  Vasopneumatic Device, press/temp:     min []  Other:    [] Skin assessment post-treatment (if applicable):    []  intact    []  redness- no adverse reaction     []redness  adverse reaction:        40 min Therapeutic Exercise:  [x]  See flow sheet   Rationale:      increase ROM, increase strength, improve coordination and increase proprioception to improve the patients ability to regain normal gait mechanics        min Therapeutic Activity:         min Neuromuscular Re-ed:        10 min Gait Training: Practice walking w SPC, using mirror to stand erect and perform normal weight shift L during L single limb support. Pt tried to increase stance time on left as he decreased pressure on SPC. min Patient Education:  YES  Reviewed HEP   []  Progressed/Changed HEP based on: Other Objective/Functional Measures:    Pt able to increase reps with step ups today. Increased resistance w SAQ to 3 lbs. Post Treatment Pain Level (on 0 to 10) scale:   3  / 10     ASSESSMENT  Assessment/Changes in Function:     Pt showing improve strength/endurance and proprioception. []  See Progress Note/Recertification   Patient will continue to benefit from skilled PT services to modify and progress therapeutic interventions, address functional mobility deficits, address ROM deficits, address strength deficits, analyze and address soft tissue restrictions, analyze and cue movement patterns, analyze and modify body mechanics/ergonomics, assess and modify postural abnormalities and address imbalance/dizziness to attain remaining goals. Progress toward goals / Updated goals:    Progressing well with strengthening, but still having difficulty with normal gait mechanics.      PLAN  [x]  Upgrade activities as tolerated YES Continue plan of care   []  Discharge due to :    []  Other:      Therapist: Joan Linares PT    Date: 4/11/2018 Time: 11:09 AM     Future Appointments  Date Time Provider [place]   4/11/2018 5:00 PM Joan Linares PT [facility] [place]   4/16/2018 3:30 PM Joan Linares, PT [facility] [place]   4/18/2018 4:30 PM Joan Linares PT [facility] [place]   4/23/2018 3:30 PM Joan Linares PT [facility] [place]   4/25/2018 4:30 PM Joan Linares PT [facility] [place]

## 2018-04-16 ENCOUNTER — HOSPITAL ENCOUNTER (OUTPATIENT)
Dept: PHYSICAL THERAPY | Age: 58
Discharge: HOME | End: 2018-04-16
Payer: COMMERCIAL

## 2018-04-16 PROCEDURE — 97116 GAIT TRAINING THERAPY: CPT | Performed by: PHYSICAL THERAPIST

## 2018-04-16 PROCEDURE — 97110 THERAPEUTIC EXERCISES: CPT | Performed by: PHYSICAL THERAPIST

## 2018-04-16 NOTE — PROGRESS NOTES
PHYSICAL THERAPY - DAILY TREATMENT NOTE    Patient Name: Kenneth Shah        Date: 2018  : 1960   YES Patient  Verified  Visit #:   15   of   22  Insurance: Payor: Lew Khan / [facility] / Product Type: Commerical /      In time: 3:30 Out time: 4:40   Total Treatment Time: 70     Medicare Time Tracking (below)   Total Timed Codes (min):  na 1:1 Treatment Time:  na     TREATMENT AREA = Left leg pain [M79.605]    SUBJECTIVE  Pain Level (on 0 to 10 scale):  3  / 10   Medication Changes/New allergies or changes in medical history, any new surgeries or procedures? NO    If yes, update Summary List   Subjective Functional Status/Changes:  []  No changes reported     Pt notes knee pain remains about the same. He is wearing brace daily.           OBJECTIVE  Modalities Rationale:     decrease edema, decrease inflammation and decrease pain to improve patient's ability to prevent soreness   min [] Estim, type/location:                                      []  att     []  unatt     []  w/US     []  w/ice    []  w/heat    min []  Mechanical Traction: type/lbs                   []  pro   []  sup   []  int   []  cont    []  before manual    []  after manual    min []  Ultrasound, settings/location:      min []  Iontophoresis w/ dexamethasone, location:                                               []  take home patch       []  in clinic   10 min [x]  Ice     []  Heat    location/position: L knee - supine after treatment    min []  Vasopneumatic Device, press/temp:     min []  Other:    [] Skin assessment post-treatment (if applicable):    []  intact    []  redness- no adverse reaction     []redness  adverse reaction:        50 min Therapeutic Exercise:  [x]  See flow sheet   Rationale:      increase ROM, increase strength and improve coordination to improve the patients ability to regain standing/walking tolerance        min Therapeutic Activity:         min Neuromuscular Re-ed:        10 min Gait Training: Using mirrors and SPC to emphasize equal stance time each leg      min Patient Education:  Lidya Campuzano   []  Progressed/Changed HEP based on: Other Objective/Functional Measures: Added single leg [employer] squats today     Post Treatment Pain Level (on 0 to 10) scale:   0  / 10     ASSESSMENT  Assessment/Changes in Function:     Pt tolerated single leg squats today on [employer], but gained confidence as he progressed. []  See Progress Note/Recertification   Patient will continue to benefit from skilled PT services to modify and progress therapeutic interventions, address functional mobility deficits, address ROM deficits, address strength deficits, analyze and address soft tissue restrictions, analyze and cue movement patterns, analyze and modify body mechanics/ergonomics and assess and modify postural abnormalities to attain remaining goals. Progress toward goals / Updated goals: Will continue to progress strength/endurance and restore normal gait mechanics.       PLAN  [x]  Upgrade activities as tolerated YES Continue plan of care   []  Discharge due to :    []  Other:      Therapist: Kimmie Chavarria PT    Date: 4/16/2018 Time: 10:34 AM     Future Appointments  Date Time Provider [place]   4/16/2018 3:30 PM Kimmie Chavarria PT [facility] [place]   4/18/2018 4:30 PM Kimmie Chavarria PT [facility] [place]   4/23/2018 3:30 PM Kimmie Chavarria PT [facility] [place]   4/25/2018 4:30 PM Kimmie Chavarria PT [facility] [place]

## 2018-04-18 ENCOUNTER — HOSPITAL ENCOUNTER (OUTPATIENT)
Dept: PHYSICAL THERAPY | Age: 58
Discharge: HOME | End: 2018-04-18
Payer: COMMERCIAL

## 2018-04-18 PROCEDURE — 97116 GAIT TRAINING THERAPY: CPT | Performed by: PHYSICAL THERAPIST

## 2018-04-18 PROCEDURE — 97110 THERAPEUTIC EXERCISES: CPT | Performed by: PHYSICAL THERAPIST

## 2018-04-18 NOTE — PROGRESS NOTES
PHYSICAL THERAPY - DAILY TREATMENT NOTE    Patient Name: Liza Davis        Date: 2018  : 1960   YES Patient  Verified  Visit #:   15   of   22  Insurance: Payor: Beth Mendoza / Plan: [address] [place] [address] Rd PT / Product Type: Commerical /      In time: 4:40 Out time: 6:00   Total Treatment Time: 70     Medicare Time Tracking (below)   Total Timed Codes (min):  na 1:1 Treatment Time:  na     TREATMENT AREA = Left leg pain [M79.605]    SUBJECTIVE  Pain Level (on 0 to 10 scale):  3- 10   Medication Changes/New allergies or changes in medical history, any new surgeries or procedures? NO    If yes, update Summary List   Subjective Functional Status/Changes:  []  No changes reported     Pt notes still having low grade soreness, but wearing brace every day.           OBJECTIVE  Modalities Rationale:     decrease edema, decrease inflammation and decrease pain to improve patient's ability to prevent soreness   min [] Estim, type/location:                                      []  att     []  unatt     []  w/US     []  w/ice    []  w/heat    min []  Mechanical Traction: type/lbs                   []  pro   []  sup   []  int   []  cont    []  before manual    []  after manual    min []  Ultrasound, settings/location:      min []  Iontophoresis w/ dexamethasone, location:                                               []  take home patch       []  in clinic   10 min [x]  Ice     []  Heat    location/position: L knee - supine after treatment    min []  Vasopneumatic Device, press/temp:     min []  Other:    [] Skin assessment post-treatment (if applicable):    []  intact    []  redness- no adverse reaction     []redness  adverse reaction:        45 min Therapeutic Exercise:  [x]  See flow sheet   Rationale:      increase ROM, increase strength, improve coordination and increase proprioception to improve the patients ability to regain normal walking tolerance      min Therapeutic Activity:         min Neuromuscular Re-ed:        15 min Gait Training: Using mirror with emphasis on weight shifting through pelvis during left single limb support. min Patient Education:  YES  Reviewed HEP   []  Progressed/Changed HEP based on: Other Objective/Functional Measures:    Increased HL hip ABD band to black today    Increased depth with squats on [employer] today     Post Treatment Pain Level (on 0 to 10) scale:   3  / 10     ASSESSMENT  Assessment/Changes in Function:     Pt is showing improved tolerance to strengthening and WB through left LE. He requires practice to try and normalize gait, but is able to maintain more normal gait mechanics after practice. []  See Progress Note/Recertification   Patient will continue to benefit from skilled PT services to modify and progress therapeutic interventions, address functional mobility deficits, address ROM deficits, address strength deficits, analyze and address soft tissue restrictions, analyze and cue movement patterns, analyze and modify body mechanics/ergonomics and assess and modify postural abnormalities to attain remaining goals. Progress toward goals / Updated goals: Will continue to increase strength/endurance to achieve LTGs.      PLAN  [x]  Upgrade activities as tolerated YES Continue plan of care   []  Discharge due to :    []  Other:      Therapist: David Ramsey PT    Date: 4/18/2018 Time: 10:02 AM     Future Appointments  Date Time Provider [place]   4/18/2018 4:30 PM David Ramsey [place] [facility] [place]   4/23/2018 3:30 PM David Ramsey PT [facility] [place]   4/25/2018 4:30 PM David Ramsey PT [facility] [place]

## 2018-04-23 ENCOUNTER — HOSPITAL ENCOUNTER (OUTPATIENT)
Dept: PHYSICAL THERAPY | Age: 58
Discharge: HOME | End: 2018-04-23
Payer: COMMERCIAL

## 2018-04-23 PROCEDURE — 97110 THERAPEUTIC EXERCISES: CPT | Performed by: PHYSICAL THERAPIST

## 2018-04-23 PROCEDURE — 97116 GAIT TRAINING THERAPY: CPT | Performed by: PHYSICAL THERAPIST

## 2018-04-23 NOTE — PROGRESS NOTES
PHYSICAL THERAPY - DAILY TREATMENT NOTE    Patient Name: Renae Sanchez        Date: 2018  : 1960   YES Patient  Verified  Visit #:     Insurance: Payor: [employer] / Plan: [address] [place] [address] Rd PT / Product Type: Commerical /      In time: 3:35 Out time: 5:00   Total Treatment Time: 70     Medicare Time Tracking (below)   Total Timed Codes (min):  na 1:1 Treatment Time:  na     TREATMENT AREA = Left leg pain [M79.605]    SUBJECTIVE  Pain Level (on 0 to 10 scale):  4  / 10   Medication Changes/New allergies or changes in medical history, any new surgeries or procedures? NO    If yes, update Summary List   Subjective Functional Status/Changes:  []  No changes reported     Pt reports knee pain remains about the same despite showing incraesed strength and walking tolerance.            OBJECTIVE  Modalities Rationale:     decrease edema, decrease inflammation and decrease pain to improve patient's ability to prevent soreness   min [] Estim, type/location:                                      []  att     []  unatt     []  w/US     []  w/ice    []  w/heat    min []  Mechanical Traction: type/lbs                   []  pro   []  sup   []  int   []  cont    []  before manual    []  after manual    min []  Ultrasound, settings/location:      min []  Iontophoresis w/ dexamethasone, location:                                               []  take home patch       []  in clinic   10 min [x]  Ice     []  Heat    location/position: L knee - supine after treatment    min []  Vasopneumatic Device, press/temp:     min []  Other:    [] Skin assessment post-treatment (if applicable):    []  intact    []  redness- no adverse reaction     []redness  adverse reaction:        45 min Therapeutic Exercise:  [x]  See flow sheet   Rationale:      increase ROM, increase strength, improve coordination and increase proprioception to improve the patients ability to increase standing/walking tolerance        min Therapeutic Activity:         min Neuromuscular Re-ed:        15 min Gait Training: Using mirror with emphasis on weight shifting through pelvis during left single limb support      min Patient Education:  YES  Reviewed HEP   []  Progressed/Changed HEP based on: Other Objective/Functional Measures:    Pt showing increase left knee strength and he still has difficulty with weight shifting to the left during left single limb support. Had discussion regarding the possibility that left knee symptoms may be referred pain from the left hip. Post Treatment Pain Level (on 0 to 10) scale:   2  / 10     ASSESSMENT  Assessment/Changes in Function:     Pt tolerated treatment well. He has most difficulty with SLR exercise, and does not have any pain with SAQ. []  See Progress Note/Recertification   Patient will continue to benefit from skilled PT services to modify and progress therapeutic interventions, address functional mobility deficits, address ROM deficits, address strength deficits, analyze and address soft tissue restrictions, analyze and cue movement patterns, analyze and modify body mechanics/ergonomics and assess and modify postural abnormalities to attain remaining goals. Progress toward goals / Updated goals: Will continue to progress functional ability as tolerated.      PLAN  [x]  Upgrade activities as tolerated YES Continue plan of care   []  Discharge due to :    []  Other:      Therapist: Joan Linares, PT    Date: 4/23/2018 Time: 12:33 PM     Future Appointments  Date Time Provider [place]   4/23/2018 3:30 PM Joan Linares, [place] [facility] [place]   4/25/2018 4:30 PM Joan Linares, PT [facility] [place]

## 2018-04-25 ENCOUNTER — HOSPITAL ENCOUNTER (OUTPATIENT)
Dept: PHYSICAL THERAPY | Age: 58
Discharge: HOME | End: 2018-04-25
Payer: COMMERCIAL

## 2018-04-25 PROCEDURE — 97116 GAIT TRAINING THERAPY: CPT

## 2018-04-25 PROCEDURE — 97110 THERAPEUTIC EXERCISES: CPT

## 2018-04-25 NOTE — PROGRESS NOTES
PHYSICAL THERAPY - DAILY TREATMENT NOTE    Patient Name: Bret Pope        Date: 2018  : 1960   yes Patient  Verified  Visit #:     Insurance: Payor: Janet Tate / Plan: [address] PT / Product Type: Commerical /      In time: 4:46 Out time: 5:50   Total Treatment Time: 64     Medicare Time Tracking (below)   Total Timed Codes (min):  NA 1:1 Treatment Time:  NA     TREATMENT AREA =  Left leg pain [M79.605]    SUBJECTIVE  Pain Level (on 0 to 10 scale):  4  / 10   Medication Changes/New allergies or changes in medical history, any new surgeries or procedures?    no  If yes, update Summary List   Subjective Functional Status/Changes:  []  No changes reported     Patient reports being more stiff in L knee than usual, states that it could possibly be the rainy weather. States that his knee begins to hurt after ~30 minutes of walking.        OBJECTIVE  Modalities Rationale:     decrease edema, decrease inflammation and decrease pain to improve patient's ability to prevent soreness   min [] Estim, type/location:                                      []  att     []  unatt     []  w/US     []  w/ice    []  w/heat    min []  Mechanical Traction: type/lbs                   []  pro   []  sup   []  int   []  cont    []  before manual    []  after manual    min []  Ultrasound, settings/location:      min []  Iontophoresis w/ dexamethasone, location:                                               []  take home patch       []  in clinic   10 min [x]  Ice     []  Heat    location/position: Supine with bolster to L knee    min []  Vasopneumatic Device, press/temp:     min []  Other:    [] Skin assessment post-treatment (if applicable):    []  intact    []  redness- no adverse reaction     []redness  adverse reaction:        44 min Therapeutic Exercise:  [x]  See flow sheet   Rationale:      increase ROM, increase strength, improve coordination and increase proprioception to improve the patients standing/walking tolerance     min Manual Therapy:    Rationale:          min Therapeutic Activity:    Rationale:       min Neuromuscular Re-ed:    Rationale:        10 min Gait Training: Using mirror to assist ambulation for proper weight shift on L LE during single limb support; focused on weight shifting through pelvis and heel-toe gait pattern   Rationale:         min Patient Education:  yes  Reviewed HEP   []  Progressed/Changed HEP based on: Other Objective/Functional Measures: FOTO: 48/100  Increased repetitions for mini squats     Post Treatment Pain Level (on 0 to 10) scale:   2  / 10     ASSESSMENT  Assessment/Changes in Function:     Demonstrated good tolerance with progression of exercises; noted no increase pain in L knee, however patient was quickly fatigued. []  See Progress Note/Recertification   Patient will continue to benefit from skilled PT services to modify and progress therapeutic interventions, address functional mobility deficits, address ROM deficits, address strength deficits, analyze and address soft tissue restrictions, analyze and cue movement patterns, analyze and modify body mechanics/ergonomics and assess and modify postural abnormalities to attain remaining goals.    Progress toward goals / Updated goals:    Slow, gradual progress towards LTG #1 and #2  PN NV       PLAN  []  Upgrade activities as tolerated yes Continue plan of care   []  Discharge due to :    []  Other:      Therapist: SPRING Gutierrez    Date: 4/25/2018 Time: 5:55 PM     Future Appointments  Date Time Provider [place]   4/25/2018 4:30 PM Elisa Marshall [facility] [place]

## 2018-04-30 ENCOUNTER — HOSPITAL ENCOUNTER (OUTPATIENT)
Dept: PHYSICAL THERAPY | Age: 58
End: 2018-04-30
Payer: COMMERCIAL

## 2018-05-02 ENCOUNTER — HOSPITAL ENCOUNTER (OUTPATIENT)
Dept: PHYSICAL THERAPY | Age: 58
Discharge: HOME | End: 2018-05-02
Payer: COMMERCIAL

## 2018-05-02 PROCEDURE — 97110 THERAPEUTIC EXERCISES: CPT | Performed by: PHYSICAL THERAPIST

## 2018-05-02 PROCEDURE — 97116 GAIT TRAINING THERAPY: CPT | Performed by: PHYSICAL THERAPIST

## 2018-05-02 NOTE — PROGRESS NOTES
PHYSICAL THERAPY - DAILY TREATMENT NOTE    Patient Name: Savannah Monzon        Date: 2018  : 1960   YES Patient  Verified  Visit #:     Insurance: Payor: Everardo Davis / Plan: [address] [place] [address] [facility] / Product Type: Commerical /      In time: 4:50 Out time: 6:10   Total Treatment Time: 70     Medicare Time Tracking (below)   Total Timed Codes (min):  na 1:1 Treatment Time:  na     TREATMENT AREA = Left leg pain [M79.605]    SUBJECTIVE  Pain Level (on 0 to 10 scale):  3  / 10   Medication Changes/New allergies or changes in medical history, any new surgeries or procedures? NO    If yes, update Summary List   Subjective Functional Status/Changes:  []  No changes reported     Pt reports still having difficulty walking longer distances due to left knee pain.           OBJECTIVE  Modalities Rationale:     decrease edema, decrease inflammation and decrease pain to improve patient's ability to prevent soreness   min [] Estim, type/location:                                      []  att     []  unatt     []  w/US     []  w/ice    []  w/heat    min []  Mechanical Traction: type/lbs                   []  pro   []  sup   []  int   []  cont    []  before manual    []  after manual    min []  Ultrasound, settings/location:      min []  Iontophoresis w/ dexamethasone, location:                                               []  take home patch       []  in clinic   10 min [x]  Ice     []  Heat    location/position: L knee - supine after treatment    min []  Vasopneumatic Device, press/temp:     min []  Other:    [] Skin assessment post-treatment (if applicable):    []  intact    []  redness- no adverse reaction     []redness  adverse reaction:        50 min Therapeutic Exercise:  [x]  See flow sheet   Rationale:      increase ROM, increase strength, improve coordination and increase proprioception to improve the patients ability to walk with normal mechanics      min Therapeutic Activity:         min Neuromuscular Re-ed:        10 min Gait Training: Practiced walking with smaller steps and managing left hip extension at terminal stance to allow more normal mechanics. Pt was able to weight shift better to the left when taking smaller steps. min Patient Education:  YES  Reviewed HEP   []  Progressed/Changed HEP based on: Other Objective/Functional Measures: Added L hip hip flexor stretching in standing prior to walking to assist with restoring normal gait mechanics      Post Treatment Pain Level (on 0 to 10) scale:   2  / 10     ASSESSMENT  Assessment/Changes in Function:     Pt able to bear more weight through left LE when walking if he allowed hip extension to occur at terminal stance. []  See Progress Note/Recertification   Patient will continue to benefit from skilled PT services to modify and progress therapeutic interventions, address functional mobility deficits, address ROM deficits, address strength deficits, analyze and address soft tissue restrictions, analyze and cue movement patterns, analyze and modify body mechanics/ergonomics and assess and modify postural abnormalities to attain remaining goals. Progress toward goals / Updated goals: Showing slow, but gradual progress with left knee strength and walking tolerance.      PLAN  [x]  Upgrade activities as tolerated YES Continue plan of care   []  Discharge due to :    []  Other:      Therapist: Cheryl Cohen PT    Date: 5/2/2018 Time: 11:41 AM     Future Appointments  Date Time Provider [place]   5/2/2018 6:00 PM Cheryl Cohen PT [facility] [place]   5/7/2018 4:30 PM Naaman Days [facility]   5/14/2018 4:30 PM Naaman Days [facility]   5/21/2018 4:30 PM Naaman Days [facility]   5/23/2018 4:30 PM Cheryl Cohen PT [facility] [place]   5/30/2018 4:30 PM Cheryl Cohen PT [facility] [place]

## 2018-05-07 ENCOUNTER — HOSPITAL ENCOUNTER (OUTPATIENT)
Dept: PHYSICAL THERAPY | Age: 58
Discharge: HOME | End: 2018-05-07
Payer: COMMERCIAL

## 2018-05-07 PROCEDURE — 97116 GAIT TRAINING THERAPY: CPT

## 2018-05-07 PROCEDURE — 97110 THERAPEUTIC EXERCISES: CPT

## 2018-05-07 NOTE — PROGRESS NOTES
PHYSICAL THERAPY - DAILY TREATMENT NOTE    Patient Name: Bobby Regalado        Date: 2018  : 1960   yes Patient  Verified  Visit #:     Insurance: Payor: Jose L Crawford / [address] [facility] / Product Type: Commerical /      In time: 4:37 Out time: 5:48   Total Treatment Time: 71     Medicare Time Tracking (below)   Total Timed Codes (min):  NA 1:1 Treatment Time:  NA     TREATMENT AREA =  Left leg pain [M79.605]    SUBJECTIVE  Pain Level (on 0 to 10 scale):  3 / 10   Medication Changes/New allergies or changes in medical history, any new surgeries or procedures?    no  If yes, update Summary List   Subjective Functional Status/Changes:  []  No changes reported     Patient currently reports no new c/o pain in the L knee    Patient reports 75% overall improvement since beginning PT; states pain at worst is 7/10 with prolonged ambulation and standing (>10 minutes). Patient will be out of town from  to  and has been educated on the importance of compliance with HEP to maintain good strength and ROM in L knee. Patient continues to utilize [facility] during ambulation and demonstrates difficulty with equal weight shift on to L LE during gait training.         OBJECTIVE  Modalities Rationale:     decrease edema, decrease inflammation and decrease pain to improve patient's ability to prevent soreness   min [] Estim, type/location:                                      []  att     []  unatt     []  w/US     []  w/ice    []  w/heat    min []  Mechanical Traction: type/lbs                   []  pro   []  sup   []  int   []  cont    []  before manual    []  after manual    min []  Ultrasound, settings/location:      min []  Iontophoresis w/ dexamethasone, location:                                               []  take home patch       []  in clinic   10 min [x]  Ice     []  Heat    location/position: Supine with bolster to L knee    min []  Vasopneumatic Device, press/temp:     min []  Other:    [] Skin assessment post-treatment (if applicable):    []  intact    []  redness- no adverse reaction     []redness  adverse reaction:        51 min Therapeutic Exercise:  [x]  See flow sheet   Rationale:      increase ROM, increase strength, improve coordination and increase proprioception to improve the patients standing/walking tolerance     min Manual Therapy:    Rationale:          min Therapeutic Activity:    Rationale:       min Neuromuscular Re-ed:    Rationale:        10 min Gait Training: Using mirror to assist ambulation for proper weight shift on L LE during single limb support; focused on weight shifting through pelvis and heel-toe gait pattern   Rationale:         min Patient Education:  yes  Reviewed HEP   []  Progressed/Changed HEP based on: Other Objective/Functional Measures:    SEE PN     Post Treatment Pain Level (on 0 to 10) scale:   0  / 10     ASSESSMENT  Assessment/Changes in Function:     SEE PN     []  See Progress Note/Recertification   Patient will continue to benefit from skilled PT services to modify and progress therapeutic interventions, address functional mobility deficits, address ROM deficits, address strength deficits, analyze and address soft tissue restrictions, analyze and cue movement patterns, analyze and modify body mechanics/ergonomics and assess and modify postural abnormalities to attain remaining goals.    Progress toward goals / Updated goals:    SEE PN       PLAN  []  Upgrade activities as tolerated yes Continue plan of care   []  Discharge due to :    []  Other:      Therapist: SPRING Jenkins    Date: 5/7/2018 Time: 6:01 PM     Future Appointments  Date Time Provider [place]   5/7/2018 4:30 PM Mary Monge [facility] [place]   [facility]   5/23/2018 4:30 PM Renetta Palacio PT [facility] [place]   5/30/2018 4:30 PM Renetta Palacio PT [facility] [place]

## 2018-05-07 NOTE — PROGRESS NOTES
[address]ren[address] Patton [address]  [facility]  [facility]  Bubba Aguilera [facility], [address] ,[address], [address]  Phone: [phone]  Fax: [phone]  PROGRESS NOTE  Patient Name: Yu Nava : 1960   Treatment/Medical Diagnosis: Left leg pain [M79.605]   Referral Source: Eitan Mims MD     Date of Initial Visit: 3/30/18 Attended Visits: 18 Missed Visits: 2     SUMMARY OF TREATMENT  Therapeutic exercises including ROM, strengthening, and stretching, gait training, postural re-education, patient education, HEP instruction, and modalities for pain control    CURRENT STATUS  Mr. Ling Villagomez has demonstrated slow, gradual progress in physical therapy; reports 75% overall improvement since beginning PT and states pain at worst is 7/10 with prolonged ambulation and standing (>10 minutes). Patient will be out of town from  to  and has been educated on the importance of compliance with HEP to maintain good strength and ROM in L knee. Patient continues to utilize [facility] during ambulation and demonstrates difficulty with equal weight shift on to L LE and decrease L hip extension during gait training. Patient would continue to benefit from skilled therapy to improve gait mechanics for decrease fall risk and address remaining functional limitations. Pt has shown significant increases in knee strength, but continues to have knee pain. It is believed that some of these symptoms may be coming from his right hip, and would recommend assessment of right hip upon return to your office. Goal/Measure of Progress Goal Met? 1. Improve FOTO score from 46 points to > or = 53 points indicating improved tolerance with ADLs in regards to prolonged ambulation with LRAD. Status at last Eval: 46/100 Current Status: 48/100 progressing   2. Pt will be able to demonstrate a 15 sec SLS on a solid surface without UE support in regards to improve midstance during gait without AD.    Status at last Eval: 10 seconds Current Status: 15 seconds with UE assistance progressing   3. Pt will demo >/= 4+/5 overall L knee strength for stair/obstacle negotiation at home and in the community   Status at last Eval: na Current Status: Flexion: 3+/5  Extension: 4/5 no     New Goals to be achieved in __3-4__  weeks:  1. Continue with LTG #1   2. Continue with LTG #2   3. Continue with LTG #3   4. Patient able to ambulate with equal LE weight shift and increase L hip extension with LRAD for home/community ambulation. G-Codes (GP): NA  RECOMMENDATIONS  Patient to continue for 2-3x/wk for 3-4 weeks to progress towards LTGs and address remaining functional limitations    If you have any questions/comments please contact us directly at [phone]. Thank you for allowing us to assist in the care of your patient. Therapist Signature: Wiley Severance Melchor, LPTA Date: 5/7/2018     Time: 6:05 PM   NOTE TO PHYSICIAN:  PLEASE COMPLETE THE ORDERS BELOW AND FAX TO   [facility] [place] [facility]: [phone]. If you are unable to process this request in 24 hours please contact our office: [phone].    ___ I have read the above report and request that my patient continue as recommended.   ___ I have read the above report and request that my patient continue therapy with the following changes/special instructions:_________________________________________________________   ___ I have read the above report and request that my patient be discharged from therapy.      Physician Signature:        Date:       Time:

## 2018-05-14 ENCOUNTER — APPOINTMENT (OUTPATIENT)
Dept: PHYSICAL THERAPY | Age: 58
End: 2018-05-14
Payer: COMMERCIAL

## 2018-05-21 ENCOUNTER — APPOINTMENT (OUTPATIENT)
Dept: PHYSICAL THERAPY | Age: 58
End: 2018-05-21
Payer: COMMERCIAL

## 2018-05-23 ENCOUNTER — APPOINTMENT (OUTPATIENT)
Dept: PHYSICAL THERAPY | Age: 58
End: 2018-05-23
Payer: COMMERCIAL

## 2018-05-30 ENCOUNTER — APPOINTMENT (OUTPATIENT)
Dept: PHYSICAL THERAPY | Age: 58
End: 2018-05-30
Payer: COMMERCIAL

## 2018-06-11 ENCOUNTER — HOSPITAL ENCOUNTER (OUTPATIENT)
Dept: PHYSICAL THERAPY | Age: 58
Discharge: HOME | End: 2018-06-11
Payer: COMMERCIAL

## 2018-06-11 PROCEDURE — 97110 THERAPEUTIC EXERCISES: CPT | Performed by: PHYSICAL THERAPIST

## 2018-06-11 NOTE — PROGRESS NOTES
PHYSICAL THERAPY - DAILY TREATMENT NOTE    Patient Name: Elvia Godoy        Date: 2018  : 1960   YES Patient  Verified  Visit #:     Insurance: Payor: Ayesha Saeed / Plan:  [address] [facility] / Product Type: Commerical /      In time: 6:05 Out time: 7:10   Total Treatment Time: 65     Medicare Time Tracking (below)   Total Timed Codes (min):  na 1:1 Treatment Time:  na     TREATMENT AREA = Left leg pain [M79.605]    SUBJECTIVE  Pain Level (on 0 to 10 scale):  4  / 10   Medication Changes/New allergies or changes in medical history, any new surgeries or procedures? NO    If yes, update Summary List   Subjective Functional Status/Changes:  []  No changes reported     Pt reports doing a lot of walking while out of town.   His pain continues in knee with prolonged walking, but resolves with rest.          OBJECTIVE  Modalities Rationale:     decrease edema, decrease inflammation and decrease pain to improve patient's ability to prevent soreness   min [] Estim, type/location:                                      []  att     []  unatt     []  w/US     []  w/ice    []  w/heat    min []  Mechanical Traction: type/lbs                   []  pro   []  sup   []  int   []  cont    []  before manual    []  after manual    min []  Ultrasound, settings/location:      min []  Iontophoresis w/ dexamethasone, location:                                               []  take home patch       []  in clinic   10 min [x]  Ice     []  Heat    location/position: L knee - supine after treatment    min []  Vasopneumatic Device, press/temp:     min []  Other:    [] Skin assessment post-treatment (if applicable):    []  intact    []  redness- no adverse reaction     []redness  adverse reaction:        55 min Therapeutic Exercise:  [x]  See flow sheet   Rationale:      increase ROM, increase strength and improve coordination to improve the patients ability to regain funcitional strength w reaching        min Therapeutic Activity:         min Neuromuscular Re-ed:         min Gait Training:       min Patient Education:  YES  Reviewed HEP   []  Progressed/Changed HEP based on: Other Objective/Functional Measures:    Pt still has most pain with full WB in L LE when walking, therefor he tends to walk with significant right lateral lean into SPC. Post Treatment Pain Level (on 0 to 10) scale:   0  / 10     ASSESSMENT  Assessment/Changes in Function:     Continue to question possibility of left hip dysfunction causing some of his left knee symptoms. []  See Progress Note/Recertification   Patient will continue to benefit from skilled PT services to modify and progress therapeutic interventions, address functional mobility deficits, address ROM deficits, address strength deficits, analyze and address soft tissue restrictions, analyze and cue movement patterns, analyze and modify body mechanics/ergonomics and assess and modify postural abnormalities to attain remaining goals. Progress toward goals / Updated goals: Will continue to progress LE strengthening/endruance to improve walking tolerance.      PLAN  [x]  Upgrade activities as tolerated YES Continue plan of care   []  Discharge due to :    []  Other:      Therapist: Renetta Palacio PT    Date: 6/11/2018 Time: 4:13 PM     Future Appointments  Date Time Provider Long [place]   6/11/2018 6:00 PM Renetta Palacio, PT [facility]   6/13/2018 6:00 PM Renetta Palacio, PT [facility]   6/18/2018 6:00 PM Renetta Palacio PT [facility]   6/20/2018 6:00 PM Renetta Palacio, PT [facility]

## 2018-06-13 ENCOUNTER — HOSPITAL ENCOUNTER (OUTPATIENT)
Dept: PHYSICAL THERAPY | Age: 58
Discharge: HOME | End: 2018-06-13
Payer: COMMERCIAL

## 2018-06-13 PROCEDURE — 97110 THERAPEUTIC EXERCISES: CPT | Performed by: PHYSICAL THERAPIST

## 2018-06-13 NOTE — PROGRESS NOTES
PHYSICAL THERAPY - DAILY TREATMENT NOTE    Patient Name: Shivam Guardado        Date: 2018  : 1960   YES Patient  Verified  Visit #:     Insurance: Payor: Berry Peppers / Plan:  [address] [facility] / Product Type: Commerical /      In time: 6:00 Out time: 7:05   Total Treatment Time: 65     Medicare Time Tracking (below)   Total Timed Codes (min):  na 1:1 Treatment Time:  na     TREATMENT AREA = Left leg pain [M79.605]    SUBJECTIVE  Pain Level (on 0 to 10 scale):  3  / 10   Medication Changes/New allergies or changes in medical history, any new surgeries or procedures? NO    If yes, update Summary List   Subjective Functional Status/Changes:  []  No changes reported     Pt denies any significant soreness after last session.           OBJECTIVE  Modalities Rationale:     decrease edema, decrease inflammation and decrease pain to improve patient's ability to prevent soreness   min [] Estim, type/location:                                      []  att     []  unatt     []  w/US     []  w/ice    []  w/heat    min []  Mechanical Traction: type/lbs                   []  pro   []  sup   []  int   []  cont    []  before manual    []  after manual    min []  Ultrasound, settings/location:      min []  Iontophoresis w/ dexamethasone, location:                                               []  take home patch       []  in clinic   10 min [x]  Ice     []  Heat    location/position: L knee - supine after treatment    min []  Vasopneumatic Device, press/temp:     min []  Other:    [] Skin assessment post-treatment (if applicable):    []  intact    []  redness- no adverse reaction     []redness  adverse reaction:        55 min Therapeutic Exercise:  [x]  See flow sheet   Rationale:      increase ROM, increase strength and improve coordination to improve the patients ability to increase walking tolerance      min Therapeutic Activity:         min Neuromuscular Re-ed:         min Gait Training:       min Patient Education:  YES  Reviewed HEP   []  Progressed/Changed HEP based on: Other Objective/Functional Measures:    Increased resistance with SAQ today (5lbs)     Post Treatment Pain Level (on 0 to 10) scale:   0  / 10     ASSESSMENT  Assessment/Changes in Function:     Pt still walking with lateral lean to the R, and avoids left hip extension during terminal stance. []  See Progress Note/Recertification   Patient will continue to benefit from skilled PT services to modify and progress therapeutic interventions, address functional mobility deficits, address ROM deficits, address strength deficits, analyze and address soft tissue restrictions, analyze and cue movement patterns, analyze and modify body mechanics/ergonomics and assess and modify postural abnormalities to attain remaining goals. Progress toward goals / Updated goals: Will continue to progress left knee strength and progress gait as tolerated.      PLAN  [x]  Upgrade activities as tolerated YES Continue plan of care   []  Discharge due to :    []  Other:      Therapist: Kennedi Vizcarra PT    Date: 6/13/2018 Time: 10:28 AM     Future Appointments  Date Time Provider [place]   6/13/2018 6:00 PM Kennedi Vizcarra PT [facility] [place]   6/18/2018 6:00 PM Kennedi Vizcarra PT [facility] [place]   6/20/2018 6:00 PM Kennedi Vizcarra PT [facility] [place]

## 2018-06-18 ENCOUNTER — HOSPITAL ENCOUNTER (OUTPATIENT)
Dept: PHYSICAL THERAPY | Age: 58
End: 2018-06-18
Payer: COMMERCIAL

## 2018-06-20 ENCOUNTER — HOSPITAL ENCOUNTER (OUTPATIENT)
Dept: PHYSICAL THERAPY | Age: 58
Discharge: HOME | End: 2018-06-20
Payer: COMMERCIAL

## 2018-06-20 PROCEDURE — 97110 THERAPEUTIC EXERCISES: CPT | Performed by: PHYSICAL THERAPIST

## 2018-06-20 NOTE — PROGRESS NOTES
PHYSICAL THERAPY - DAILY TREATMENT NOTE    Patient Name: Zoë Tidwell        Date: 2018  : 1960   YES Patient  Verified  Visit #:     Insurance: Payor: Samella Oats / [address]:  [address] [facility] / Product Type: Commerical /      In time: 6:10 Out time: 7:15   Total Treatment Time: 65     Medicare Time Tracking (below)   Total Timed Codes (min):  na 1:1 Treatment Time:  na     TREATMENT AREA = Left leg pain [M79.605]    SUBJECTIVE  Pain Level (on 0 to 10 scale):  3  / 10   Medication Changes/New allergies or changes in medical history, any new surgeries or procedures? NO    If yes, update Summary List   Subjective Functional Status/Changes:  []  No changes reported     Pt reports wearing knee brace regularly, but is still getting pain at certain times.           OBJECTIVE  Modalities Rationale:     decrease edema, decrease inflammation and decrease pain to improve patient's ability to prevent soreness   min [] Estim, type/location:                                      []  att     []  unatt     []  w/US     []  w/ice    []  w/heat    min []  Mechanical Traction: type/lbs                   []  pro   []  sup   []  int   []  cont    []  before manual    []  after manual    min []  Ultrasound, settings/location:      min []  Iontophoresis w/ dexamethasone, location:                                               []  take home patch       []  in clinic   10 min [x]  Ice     []  Heat    location/position: L knee - supine after treatment    min []  Vasopneumatic Device, press/temp:     min []  Other:    [] Skin assessment post-treatment (if applicable):    []  intact    []  redness- no adverse reaction     []redness  adverse reaction:        55 min Therapeutic Exercise:  [x]  See flow sheet   Rationale:      increase ROM, increase strength and improve coordination to improve the patients ability to normalize walking      min Therapeutic Activity:         min Neuromuscular Re-ed:         min Gait Training:       min Patient Education:  YES  Reviewed HEP   []  Progressed/Changed HEP based on: Other Objective/Functional Measures:    Performed SAQ  w 7lbs today and no symptoms    Pain in knee reproduced with passive hip flexion to 90°, still has pain when attempting to perform SLS, and lacks full right hip extension     Post Treatment Pain Level (on 0 to 10) scale:   2  / 10     ASSESSMENT  Assessment/Changes in Function:     Pt showing improved knee flexibility and strength, but symptoms appear to be coming from his hip joint. []  See Progress Note/Recertification   Patient will continue to benefit from skilled PT services to modify and progress therapeutic interventions, address functional mobility deficits, address ROM deficits, address strength deficits, analyze and address soft tissue restrictions, analyze and cue movement patterns, analyze and modify body mechanics/ergonomics and assess and modify postural abnormalities to attain remaining goals. Progress toward goals / Updated goals: Will hold PT until after follow-up with MD next week, and await recommendations.       PLAN  []  Upgrade activities as tolerated    []  Discharge due to :    [x]  Other: Will hold PT until follow-up with MD     Therapist: Cheryl Cohen PT    Date: 6/20/2018 Time: 1:20 PM     Future Appointments  Date Time Provider Long [place]   6/20/2018 6:00 PM Cheryl Cohen PT [facility] [place]

## 2018-08-01 NOTE — PROGRESS NOTES
Brenna Patton [address]  [facility] PHYSICAL [facility]ERAPY  [facility], [address] [place] [address], [address]  Phone: [phone]  Fax: [phone] DISCHARGE SUMMARY Patient Name: Carmen Carter : 1960 Treatment/Medical Diagnosis: Left leg pain [M79.605] Referral Source: Esme Day MD    
Date of Initial Visit: 18 Attended Visits: 21 Missed Visits: 4 CURRENT STATUS Pt did not return or call after last progress note. His current status is unknown. Will DC. RECOMMENDATIONS Discontinue therapy due to lack of attendance or compliance. If you have any questions/comments please contact us directly at [phone]. Thank you for allowing us to assist in the care of your patient. Therapist Signature: Cheryl Cohen PT Date: 18   Time: 2:32 PM